# Patient Record
Sex: MALE | Race: WHITE
[De-identification: names, ages, dates, MRNs, and addresses within clinical notes are randomized per-mention and may not be internally consistent; named-entity substitution may affect disease eponyms.]

---

## 2020-02-10 ENCOUNTER — HOSPITAL ENCOUNTER (EMERGENCY)
Dept: HOSPITAL 47 - EC | Age: 35
Discharge: HOME | End: 2020-02-10
Payer: COMMERCIAL

## 2020-02-10 VITALS — SYSTOLIC BLOOD PRESSURE: 135 MMHG | TEMPERATURE: 98.5 F | DIASTOLIC BLOOD PRESSURE: 69 MMHG

## 2020-02-10 VITALS — RESPIRATION RATE: 20 BRPM | HEART RATE: 67 BPM

## 2020-02-10 DIAGNOSIS — E03.9: ICD-10-CM

## 2020-02-10 DIAGNOSIS — Z88.5: ICD-10-CM

## 2020-02-10 DIAGNOSIS — R20.2: Primary | ICD-10-CM

## 2020-02-10 DIAGNOSIS — F17.200: ICD-10-CM

## 2020-02-10 DIAGNOSIS — Z88.6: ICD-10-CM

## 2020-02-10 LAB
ALBUMIN SERPL-MCNC: 4.2 G/DL (ref 3.5–5)
ALP SERPL-CCNC: 68 U/L (ref 38–126)
ALT SERPL-CCNC: 42 U/L (ref 4–49)
ANION GAP SERPL CALC-SCNC: 7 MMOL/L
AST SERPL-CCNC: 31 U/L (ref 17–59)
BASOPHILS # BLD AUTO: 0.2 K/UL (ref 0–0.2)
BASOPHILS NFR BLD AUTO: 2 %
BUN SERPL-SCNC: 17 MG/DL (ref 9–20)
CALCIUM SPEC-MCNC: 9.2 MG/DL (ref 8.4–10.2)
CHLORIDE SERPL-SCNC: 100 MMOL/L (ref 98–107)
CO2 SERPL-SCNC: 27 MMOL/L (ref 22–30)
EOSINOPHIL # BLD AUTO: 0.1 K/UL (ref 0–0.7)
EOSINOPHIL NFR BLD AUTO: 1 %
ERYTHROCYTE [DISTWIDTH] IN BLOOD BY AUTOMATED COUNT: 5.22 M/UL (ref 4.3–5.9)
ERYTHROCYTE [DISTWIDTH] IN BLOOD: 12.2 % (ref 11.5–15.5)
GLUCOSE SERPL-MCNC: 87 MG/DL (ref 74–99)
HCT VFR BLD AUTO: 44.4 % (ref 39–53)
HGB BLD-MCNC: 16.1 GM/DL (ref 13–17.5)
LYMPHOCYTES # SPEC AUTO: 3.2 K/UL (ref 1–4.8)
LYMPHOCYTES NFR SPEC AUTO: 36 %
MCH RBC QN AUTO: 30.8 PG (ref 25–35)
MCHC RBC AUTO-ENTMCNC: 36.3 G/DL (ref 31–37)
MCV RBC AUTO: 84.9 FL (ref 80–100)
MONOCYTES # BLD AUTO: 0.5 K/UL (ref 0–1)
MONOCYTES NFR BLD AUTO: 6 %
NEUTROPHILS # BLD AUTO: 4.8 K/UL (ref 1.3–7.7)
NEUTROPHILS NFR BLD AUTO: 53 %
PH UR: 5 [PH] (ref 5–8)
PLATELET # BLD AUTO: 233 K/UL (ref 150–450)
POTASSIUM SERPL-SCNC: 3.8 MMOL/L (ref 3.5–5.1)
PROT SERPL-MCNC: 7 G/DL (ref 6.3–8.2)
SODIUM SERPL-SCNC: 134 MMOL/L (ref 137–145)
SP GR UR: 1.01 (ref 1–1.03)
T4 FREE SERPL-MCNC: 1.1 NG/DL (ref 0.78–2.19)
UROBILINOGEN UR QL STRIP: <2 MG/DL (ref ?–2)
VIT B12 SERPL-MCNC: 256 PG/ML (ref 200–944)
WBC # BLD AUTO: 9.1 K/UL (ref 3.8–10.6)

## 2020-02-10 PROCEDURE — 85025 COMPLETE CBC W/AUTO DIFF WBC: CPT

## 2020-02-10 PROCEDURE — 96375 TX/PRO/DX INJ NEW DRUG ADDON: CPT

## 2020-02-10 PROCEDURE — 96374 THER/PROPH/DIAG INJ IV PUSH: CPT

## 2020-02-10 PROCEDURE — 84443 ASSAY THYROID STIM HORMONE: CPT

## 2020-02-10 PROCEDURE — 36415 COLL VENOUS BLD VENIPUNCTURE: CPT

## 2020-02-10 PROCEDURE — 84484 ASSAY OF TROPONIN QUANT: CPT

## 2020-02-10 PROCEDURE — 93005 ELECTROCARDIOGRAM TRACING: CPT

## 2020-02-10 PROCEDURE — 80306 DRUG TEST PRSMV INSTRMNT: CPT

## 2020-02-10 PROCEDURE — 96361 HYDRATE IV INFUSION ADD-ON: CPT

## 2020-02-10 PROCEDURE — 99284 EMERGENCY DEPT VISIT MOD MDM: CPT

## 2020-02-10 PROCEDURE — 80053 COMPREHEN METABOLIC PANEL: CPT

## 2020-02-10 PROCEDURE — 82607 VITAMIN B-12: CPT

## 2020-02-10 PROCEDURE — 81003 URINALYSIS AUTO W/O SCOPE: CPT

## 2020-02-10 PROCEDURE — 84439 ASSAY OF FREE THYROXINE: CPT

## 2020-02-10 NOTE — ED
General Adult HPI





- General


Chief complaint: Recheck/Abnormal Lab/Rx


Stated complaint: Arm numbness, shaky


Time Seen by Provider: 02/10/20 00:18


Source: patient


Mode of arrival: ambulatory


Limitations: no limitations





- History of Present Illness


Initial comments: 


34-year-old male patient presents to the emergency department today for 

evaluation of tingling to the bilateral upper extremities.  Patient states the 

tingling extends over the top of the arm to the pinky fingers.  Patient states 

that this is been going on throughout the day today.  States occasionally he'll 

have a tingling sensation in both legs as well.  Patient states that he feels 

lightheaded whenever he goes from a standing to a sitting position.  He denies 

any syncope or room spinning.  States he was recently treated for upper 

respiratory infection with steroids, he did complete the steroids yesterday.  

States his symptoms with his have improved.  Denies any ear pain or fullness.  

Denies any fever or chills.  Patient denies any chronic medical conditions or 

use of medications.  Denies alcohol or drug use.  Denies any recent head or neck

injury.  Denies history of similar symptoms. Patient denies any recent rash, 

shortness breath, chest pain, abdominal pain, nausea, vomiting, diarrhea, 

constipation, back pain, weakness, hematuria, dysuria, urinary urgency, urinary 

frequency, headache, visual changes, or any other complaints.








- Related Data


                                Home Medications











 Medication  Instructions  Recorded  Confirmed


 


No Known Home Medications  02/12/15 02/12/15











                                    Allergies











Allergy/AdvReac Type Severity Reaction Status Date / Time


 


hydrocodone bitartrate Allergy  Unknown Verified 02/10/20 00:16





[From Vicodin]     














Review of Systems


ROS Statement: 


Those systems with pertinent positive or pertinent negative responses have been 

documented in the HPI.





ROS Other: All systems not noted in ROS Statement are negative.





Past Medical History


Past Medical History: No Reported History


History of Any Multi-Drug Resistant Organisms: None Reported


Past Surgical History: No Surgical Hx Reported


Additional Past Surgical History / Comment(s): TACHO, RETINAL DETATCHMENT SURGERY,

facial


Past Psychological History: ADD/ADHD


Smoking Status: Current every day smoker


Past Alcohol Use History: None Reported


Past Drug Use History: None Reported





General Exam


Limitations: no limitations


General appearance: alert, in no apparent distress, other (This is a well-

developed, well-nourished adult male patient in no acute distress.  Vital signs 

upon presentation are temperature 97.5F, pulse 90, respirations 18, blood 

pressure 142/93, pulse ox 97% on room air.)


Eye exam: Present: normal appearance, PERRL, EOMI.  Absent: scleral icterus, 

conjunctival injection, periorbital swelling


ENT exam: Present: normal exam, normal oropharynx, mucous membranes moist


Neck exam: Present: normal inspection, full ROM.  Absent: tenderness, 

meningismus, lymphadenopathy


Respiratory exam: Present: normal lung sounds bilaterally.  Absent: respiratory 

distress, wheezes, rales, rhonchi, stridor


Cardiovascular Exam: Present: regular rate, normal rhythm, normal heart sounds. 

Absent: systolic murmur, diastolic murmur, rubs, gallop, clicks


GI/Abdominal exam: Present: soft, normal bowel sounds.  Absent: distended, 

tenderness, guarding, rebound, rigid


Extremities exam: Present: normal inspection, full ROM, normal capillary refill,

other (Skin to the upper and lower extremities is pink, warm, and dry.  Cap 

refills less than 3 seconds.  Radial pulses are 2+ and equal bilaterally.  Pedal

and posttibial pulses are 2+ and equal bilaterally.).  Absent: tenderness, pedal

edema, joint swelling, calf tenderness


Back exam: Present: normal inspection.  Absent: vertebral tenderness


Neurological exam: Present: alert, oriented X3, CN II-XII intact, other 

(Strength in all 4 extremities is 5/5.)


Psychiatric exam: Present: normal affect, normal mood


Skin exam: Present: warm, dry, intact, normal color.  Absent: rash





Course


                                   Vital Signs











  02/10/20 02/10/20 02/10/20





  00:12 00:29 01:16


 


Temperature 97.5 F L 97.4 F L 


 


Pulse Rate 90 63 63


 


Respiratory 18 20 18





Rate   


 


Blood Pressure 142/93 135/93 143/85


 


O2 Sat by Pulse 97 96 96





Oximetry   














  02/10/20





  02:16


 


Temperature 


 


Pulse Rate 67


 


Respiratory 20





Rate 


 


Blood Pressure 139/71


 


O2 Sat by Pulse 96





Oximetry 














EKG Findings





- EKG Comments:


EKG Findings:: EKG obtained at 01 43 shows several sinus rhythm with a sinus 

arrhythmia.  Ventricular rate is 62, TX interval 164, QRS duration 110, , 

.  No evidence of ST elevation or depression.





Medical Decision Making





- Medical Decision Making


34 year-old male patient presents to the emergency department today for 

evaluation of bilateral arm tingling and feeling jittery.  Physical examination 

is unremarkable.  Is neurologically intact with no focal deficits.  Labs 

reviewed and your does reveal an elevated TSH, but normal T4.  Vitamin B12 is 

pending.  EKG was unremarkable.  I did discuss findings and results with the 

patient.  We did discuss possible cervical radiculopathy as a cause for his 

symptoms.  He is instructed to follow-up with his primary care physician for 

recheck in 1-2 days.  Return parameters were discussed in detail.  He verbalizes

understanding and agrees with this plan.








- Lab Data


Result diagrams: 


                                 02/10/20 00:57





                                 02/10/20 01:10


                                   Lab Results











  02/10/20 02/10/20 02/10/20 Range/Units





  00:57 00:57 00:57 


 


WBC  9.1    (3.8-10.6)  k/uL


 


RBC  5.22    (4.30-5.90)  m/uL


 


Hgb  16.1    (13.0-17.5)  gm/dL


 


Hct  44.4    (39.0-53.0)  %


 


MCV  84.9    (80.0-100.0)  fL


 


MCH  30.8    (25.0-35.0)  pg


 


MCHC  36.3    (31.0-37.0)  g/dL


 


RDW  12.2    (11.5-15.5)  %


 


Plt Count  233    (150-450)  k/uL


 


Neutrophils %  53    %


 


Lymphocytes %  36    %


 


Monocytes %  6    %


 


Eosinophils %  1    %


 


Basophils %  2    %


 


Neutrophils #  4.8    (1.3-7.7)  k/uL


 


Lymphocytes #  3.2    (1.0-4.8)  k/uL


 


Monocytes #  0.5    (0-1.0)  k/uL


 


Eosinophils #  0.1    (0-0.7)  k/uL


 


Basophils #  0.2    (0-0.2)  k/uL


 


Sodium     (137-145)  mmol/L


 


Potassium     (3.5-5.1)  mmol/L


 


Chloride     ()  mmol/L


 


Carbon Dioxide     (22-30)  mmol/L


 


Anion Gap     mmol/L


 


BUN     (9-20)  mg/dL


 


Creatinine     (0.66-1.25)  mg/dL


 


Est GFR (CKD-EPI)AfAm     (>60 ml/min/1.73 sqM)  


 


Est GFR (CKD-EPI)NonAf     (>60 ml/min/1.73 sqM)  


 


Glucose     (74-99)  mg/dL


 


Calcium     (8.4-10.2)  mg/dL


 


Total Bilirubin     (0.2-1.3)  mg/dL


 


AST     (17-59)  U/L


 


ALT     (4-49)  U/L


 


Alkaline Phosphatase     ()  U/L


 


Troponin I   <0.012   (0.000-0.034)  ng/mL


 


Total Protein     (6.3-8.2)  g/dL


 


Albumin     (3.5-5.0)  g/dL


 


TSH     (0.465-4.680)  mIU/L


 


Free T4     (0.78-2.19)  ng/dL


 


Urine Color    Yellow  


 


Urine Appearance    Clear  (Clear)  


 


Urine pH    5.0  (5.0-8.0)  


 


Ur Specific Gravity    1.015  (1.001-1.035)  


 


Urine Protein    Negative  (Negative)  


 


Urine Glucose (UA)    Negative  (Negative)  


 


Urine Ketones    Negative  (Negative)  


 


Urine Blood    Negative  (Negative)  


 


Urine Nitrite    Negative  (Negative)  


 


Urine Bilirubin    Negative  (Negative)  


 


Urine Urobilinogen    <2.0  (<2.0)  mg/dL


 


Ur Leukocyte Esterase    Negative  (Negative)  


 


Urine Opiates Screen    Not Detected  (NotDetected)  


 


Ur Oxycodone Screen    Not Detected  (NotDetected)  


 


Urine Methadone Screen    Not Detected  (NotDetected)  


 


Ur Propoxyphene Screen    Not Detected  (NotDetected)  


 


Ur Barbiturates Screen    Not Detected  (NotDetected)  


 


U Tricyclic Antidepress    Not Detected  (NotDetected)  


 


Ur Phencyclidine Scrn    Not Detected  (NotDetected)  


 


Ur Amphetamines Screen    Not Detected  (NotDetected)  


 


U Methamphetamines Scrn    Not Detected  (NotDetected)  


 


U Benzodiazepines Scrn    Not Detected  (NotDetected)  


 


Urine Cocaine Screen    Not Detected  (NotDetected)  


 


U Marijuana (THC) Screen    Not Detected  (NotDetected)  














  02/10/20 Range/Units





  01:10 


 


WBC   (3.8-10.6)  k/uL


 


RBC   (4.30-5.90)  m/uL


 


Hgb   (13.0-17.5)  gm/dL


 


Hct   (39.0-53.0)  %


 


MCV   (80.0-100.0)  fL


 


MCH   (25.0-35.0)  pg


 


MCHC   (31.0-37.0)  g/dL


 


RDW   (11.5-15.5)  %


 


Plt Count   (150-450)  k/uL


 


Neutrophils %   %


 


Lymphocytes %   %


 


Monocytes %   %


 


Eosinophils %   %


 


Basophils %   %


 


Neutrophils #   (1.3-7.7)  k/uL


 


Lymphocytes #   (1.0-4.8)  k/uL


 


Monocytes #   (0-1.0)  k/uL


 


Eosinophils #   (0-0.7)  k/uL


 


Basophils #   (0-0.2)  k/uL


 


Sodium  134 L  (137-145)  mmol/L


 


Potassium  3.8  (3.5-5.1)  mmol/L


 


Chloride  100  ()  mmol/L


 


Carbon Dioxide  27  (22-30)  mmol/L


 


Anion Gap  7  mmol/L


 


BUN  17  (9-20)  mg/dL


 


Creatinine  0.71  (0.66-1.25)  mg/dL


 


Est GFR (CKD-EPI)AfAm  >90  (>60 ml/min/1.73 sqM)  


 


Est GFR (CKD-EPI)NonAf  >90  (>60 ml/min/1.73 sqM)  


 


Glucose  87  (74-99)  mg/dL


 


Calcium  9.2  (8.4-10.2)  mg/dL


 


Total Bilirubin  0.5  (0.2-1.3)  mg/dL


 


AST  31  (17-59)  U/L


 


ALT  42  (4-49)  U/L


 


Alkaline Phosphatase  68  ()  U/L


 


Troponin I   (0.000-0.034)  ng/mL


 


Total Protein  7.0  (6.3-8.2)  g/dL


 


Albumin  4.2  (3.5-5.0)  g/dL


 


TSH  6.560 H  (0.465-4.680)  mIU/L


 


Free T4  1.10  (0.78-2.19)  ng/dL


 


Urine Color   


 


Urine Appearance   (Clear)  


 


Urine pH   (5.0-8.0)  


 


Ur Specific Gravity   (1.001-1.035)  


 


Urine Protein   (Negative)  


 


Urine Glucose (UA)   (Negative)  


 


Urine Ketones   (Negative)  


 


Urine Blood   (Negative)  


 


Urine Nitrite   (Negative)  


 


Urine Bilirubin   (Negative)  


 


Urine Urobilinogen   (<2.0)  mg/dL


 


Ur Leukocyte Esterase   (Negative)  


 


Urine Opiates Screen   (NotDetected)  


 


Ur Oxycodone Screen   (NotDetected)  


 


Urine Methadone Screen   (NotDetected)  


 


Ur Propoxyphene Screen   (NotDetected)  


 


Ur Barbiturates Screen   (NotDetected)  


 


U Tricyclic Antidepress   (NotDetected)  


 


Ur Phencyclidine Scrn   (NotDetected)  


 


Ur Amphetamines Screen   (NotDetected)  


 


U Methamphetamines Scrn   (NotDetected)  


 


U Benzodiazepines Scrn   (NotDetected)  


 


Urine Cocaine Screen   (NotDetected)  


 


U Marijuana (THC) Screen   (NotDetected)  














Disposition


Clinical Impression: 


 Hypothyroid, Paresthesia of left upper extremity, Paresthesia of right upper 

extremity





Disposition: HOME SELF-CARE


Condition: Good


Instructions (If sedation given, give patient instructions):  Hypothyroidism 

(ED), Paresthesia (ED)


Additional Instructions: 


Increase fluids.  Rest.  Follow-up with the primary care physician for recheck 

in 1-2 days.  Discuss your hypothyroid.  Return to the emergency department 

immediately for any new, worsening, or concerning symptoms.


Is patient prescribed a controlled substance at d/c from ED?: No


Referrals: 


Geni Perera DO [Primary Care Provider] - 1-2 days


Time of Disposition: 03:24